# Patient Record
Sex: FEMALE | Race: BLACK OR AFRICAN AMERICAN | NOT HISPANIC OR LATINO | Employment: UNEMPLOYED | RURAL
[De-identification: names, ages, dates, MRNs, and addresses within clinical notes are randomized per-mention and may not be internally consistent; named-entity substitution may affect disease eponyms.]

---

## 2018-11-26 ENCOUNTER — HISTORICAL (OUTPATIENT)
Dept: ADMINISTRATIVE | Facility: HOSPITAL | Age: 28
End: 2018-11-26

## 2018-11-28 LAB
LAB AP CLINICAL INFORMATION: NORMAL
LAB AP COMMENTS: NORMAL
LAB AP GENERAL CAT - HISTORICAL: NORMAL
LAB AP INTERPRETATION/RESULT - HISTORICAL: NEGATIVE
LAB AP SPECIMEN ADEQUACY - HISTORICAL: NORMAL
LAB AP SPECIMEN SUBMITTED - HISTORICAL: NORMAL

## 2020-10-27 ENCOUNTER — HISTORICAL (OUTPATIENT)
Dept: ADMINISTRATIVE | Facility: HOSPITAL | Age: 30
End: 2020-10-27

## 2020-10-27 LAB — PAP SMEAR: ABNORMAL

## 2020-11-02 LAB
LAB AP CLINICAL INFORMATION: NORMAL
LAB AP GENERAL CAT - HISTORICAL: NORMAL
LAB AP INTERPRETATION/RESULT - HISTORICAL: NORMAL
LAB AP SPECIMEN ADEQUACY - HISTORICAL: NORMAL
LAB AP SPECIMEN SUBMITTED - HISTORICAL: NORMAL

## 2021-05-18 ENCOUNTER — OFFICE VISIT (OUTPATIENT)
Dept: OBSTETRICS AND GYNECOLOGY | Facility: CLINIC | Age: 31
End: 2021-05-18
Payer: MEDICAID

## 2021-05-18 VITALS
SYSTOLIC BLOOD PRESSURE: 132 MMHG | HEART RATE: 96 BPM | BODY MASS INDEX: 34.34 KG/M2 | WEIGHT: 186.63 LBS | TEMPERATURE: 97 F | HEIGHT: 62 IN | DIASTOLIC BLOOD PRESSURE: 82 MMHG

## 2021-05-18 DIAGNOSIS — Z30.42 ENCOUNTER FOR SURVEILLANCE OF INJECTABLE CONTRACEPTIVE: Primary | ICD-10-CM

## 2021-05-18 PROCEDURE — 96372 THER/PROPH/DIAG INJ SC/IM: CPT | Mod: ,,, | Performed by: MIDWIFE

## 2021-05-18 PROCEDURE — 99213 OFFICE O/P EST LOW 20 MIN: CPT | Mod: 25,,, | Performed by: MIDWIFE

## 2021-05-18 PROCEDURE — 96372 PR INJECTION,THERAP/PROPH/DIAG2ST, IM OR SUBCUT: ICD-10-PCS | Mod: ,,, | Performed by: MIDWIFE

## 2021-05-18 PROCEDURE — 99213 PR OFFICE/OUTPT VISIT, EST, LEVL III, 20-29 MIN: ICD-10-PCS | Mod: 25,,, | Performed by: MIDWIFE

## 2021-05-18 RX ORDER — MEDROXYPROGESTERONE ACETATE 150 MG/ML
150 INJECTION, SUSPENSION INTRAMUSCULAR
Status: COMPLETED | OUTPATIENT
Start: 2021-05-18 | End: 2021-05-18

## 2021-05-18 RX ORDER — MEDROXYPROGESTERONE ACETATE 150 MG/ML
150 INJECTION, SUSPENSION INTRAMUSCULAR
COMMUNITY
End: 2021-11-23 | Stop reason: ALTCHOICE

## 2021-05-18 RX ADMIN — MEDROXYPROGESTERONE ACETATE 150 MG: 150 INJECTION, SUSPENSION INTRAMUSCULAR at 01:05

## 2021-05-22 PROBLEM — E66.9 OBESITY: Chronic | Status: ACTIVE | Noted: 2021-05-22

## 2021-08-24 ENCOUNTER — OFFICE VISIT (OUTPATIENT)
Dept: OBSTETRICS AND GYNECOLOGY | Facility: CLINIC | Age: 31
End: 2021-08-24
Payer: MEDICAID

## 2021-08-24 VITALS
TEMPERATURE: 98 F | WEIGHT: 188.5 LBS | OXYGEN SATURATION: 100 % | BODY MASS INDEX: 34.69 KG/M2 | HEIGHT: 62 IN | RESPIRATION RATE: 18 BRPM | HEART RATE: 92 BPM | SYSTOLIC BLOOD PRESSURE: 115 MMHG | DIASTOLIC BLOOD PRESSURE: 81 MMHG

## 2021-08-24 DIAGNOSIS — Z30.42 ENCOUNTER FOR SURVEILLANCE OF INJECTABLE CONTRACEPTIVE: ICD-10-CM

## 2021-08-24 DIAGNOSIS — Z72.51 HIGH RISK SEXUAL BEHAVIOR, UNSPECIFIED TYPE: Primary | ICD-10-CM

## 2021-08-24 LAB
B-HCG UR QL: NEGATIVE
CTP QC/QA: YES

## 2021-08-24 PROCEDURE — 99212 PR OFFICE/OUTPT VISIT, EST, LEVL II, 10-19 MIN: ICD-10-PCS | Mod: 25,,, | Performed by: ADVANCED PRACTICE MIDWIFE

## 2021-08-24 PROCEDURE — 96372 PR INJECTION,THERAP/PROPH/DIAG2ST, IM OR SUBCUT: ICD-10-PCS | Mod: ,,, | Performed by: ADVANCED PRACTICE MIDWIFE

## 2021-08-24 PROCEDURE — 96372 THER/PROPH/DIAG INJ SC/IM: CPT | Mod: ,,, | Performed by: ADVANCED PRACTICE MIDWIFE

## 2021-08-24 PROCEDURE — 99212 OFFICE O/P EST SF 10 MIN: CPT | Mod: 25,,, | Performed by: ADVANCED PRACTICE MIDWIFE

## 2021-08-24 PROCEDURE — 81025 URINE PREGNANCY TEST: CPT | Mod: RHCUB | Performed by: ADVANCED PRACTICE MIDWIFE

## 2021-08-24 RX ORDER — MEDROXYPROGESTERONE ACETATE 150 MG/ML
150 INJECTION, SUSPENSION INTRAMUSCULAR
Status: COMPLETED | OUTPATIENT
Start: 2021-08-24 | End: 2021-08-24

## 2021-08-24 RX ADMIN — MEDROXYPROGESTERONE ACETATE 150 MG: 150 INJECTION, SUSPENSION INTRAMUSCULAR at 03:08

## 2021-11-23 ENCOUNTER — OFFICE VISIT (OUTPATIENT)
Dept: OBSTETRICS AND GYNECOLOGY | Facility: CLINIC | Age: 31
End: 2021-11-23
Payer: MEDICAID

## 2021-11-23 VITALS
HEIGHT: 62 IN | SYSTOLIC BLOOD PRESSURE: 138 MMHG | HEART RATE: 96 BPM | DIASTOLIC BLOOD PRESSURE: 87 MMHG | BODY MASS INDEX: 34.56 KG/M2 | RESPIRATION RATE: 17 BRPM | WEIGHT: 187.81 LBS

## 2021-11-23 DIAGNOSIS — Z30.42 ENCOUNTER FOR SURVEILLANCE OF INJECTABLE CONTRACEPTIVE: Primary | ICD-10-CM

## 2021-11-23 PROCEDURE — 99212 PR OFFICE/OUTPT VISIT, EST, LEVL II, 10-19 MIN: ICD-10-PCS | Mod: 25,,, | Performed by: ADVANCED PRACTICE MIDWIFE

## 2021-11-23 PROCEDURE — 99212 OFFICE O/P EST SF 10 MIN: CPT | Mod: 25,,, | Performed by: ADVANCED PRACTICE MIDWIFE

## 2021-11-23 PROCEDURE — 96372 THER/PROPH/DIAG INJ SC/IM: CPT | Mod: ,,, | Performed by: ADVANCED PRACTICE MIDWIFE

## 2021-11-23 PROCEDURE — 96372 PR INJECTION,THERAP/PROPH/DIAG2ST, IM OR SUBCUT: ICD-10-PCS | Mod: ,,, | Performed by: ADVANCED PRACTICE MIDWIFE

## 2021-11-23 RX ORDER — MEDROXYPROGESTERONE ACETATE 150 MG/ML
150 INJECTION, SUSPENSION INTRAMUSCULAR
Status: COMPLETED | OUTPATIENT
Start: 2021-11-23 | End: 2021-11-23

## 2021-11-23 RX ADMIN — MEDROXYPROGESTERONE ACETATE 150 MG: 150 INJECTION, SUSPENSION INTRAMUSCULAR at 02:11

## 2021-12-21 ENCOUNTER — OFFICE VISIT (OUTPATIENT)
Dept: OBSTETRICS AND GYNECOLOGY | Facility: CLINIC | Age: 31
End: 2021-12-21
Payer: MEDICAID

## 2021-12-21 VITALS
OXYGEN SATURATION: 99 % | DIASTOLIC BLOOD PRESSURE: 87 MMHG | WEIGHT: 188 LBS | BODY MASS INDEX: 34.6 KG/M2 | TEMPERATURE: 97 F | HEIGHT: 62 IN | RESPIRATION RATE: 17 BRPM | HEART RATE: 102 BPM | SYSTOLIC BLOOD PRESSURE: 127 MMHG

## 2021-12-21 DIAGNOSIS — Z12.4 ENCOUNTER FOR PAPANICOLAOU SMEAR FOR CERVICAL CANCER SCREENING: ICD-10-CM

## 2021-12-21 DIAGNOSIS — Z01.419 WOMEN'S ANNUAL ROUTINE GYNECOLOGICAL EXAMINATION: Primary | ICD-10-CM

## 2021-12-21 DIAGNOSIS — K21.9 MILD ACID REFLUX: ICD-10-CM

## 2021-12-21 DIAGNOSIS — Z72.51 HIGH RISK HETEROSEXUAL BEHAVIOR: ICD-10-CM

## 2021-12-21 LAB
CANDIDA SPECIES: NEGATIVE
GARDNERELLA: POSITIVE
TRICHOMONAS: NEGATIVE

## 2021-12-21 PROCEDURE — 88142 CYTOPATH C/V THIN LAYER: CPT | Mod: GCY | Performed by: ADVANCED PRACTICE MIDWIFE

## 2021-12-21 PROCEDURE — 87491 CHLAMYDIA/GONORRHOEAE(GC), PCR: ICD-10-PCS | Mod: ,,, | Performed by: CLINICAL MEDICAL LABORATORY

## 2021-12-21 PROCEDURE — 87624 HUMAN PAPILLOMAVIRUS (HPV): ICD-10-PCS | Mod: ,,, | Performed by: CLINICAL MEDICAL LABORATORY

## 2021-12-21 PROCEDURE — 87660 BACTERIAL VAGINOSIS: ICD-10-PCS | Mod: ,,, | Performed by: CLINICAL MEDICAL LABORATORY

## 2021-12-21 PROCEDURE — 87510 GARDNER VAG DNA DIR PROBE: CPT | Mod: ,,, | Performed by: CLINICAL MEDICAL LABORATORY

## 2021-12-21 PROCEDURE — 87510 BACTERIAL VAGINOSIS: ICD-10-PCS | Mod: ,,, | Performed by: CLINICAL MEDICAL LABORATORY

## 2021-12-21 PROCEDURE — 87491 CHLMYD TRACH DNA AMP PROBE: CPT | Mod: ,,, | Performed by: CLINICAL MEDICAL LABORATORY

## 2021-12-21 PROCEDURE — 87660 TRICHOMONAS VAGIN DIR PROBE: CPT | Mod: ,,, | Performed by: CLINICAL MEDICAL LABORATORY

## 2021-12-21 PROCEDURE — 87624 HPV HI-RISK TYP POOLED RSLT: CPT | Mod: ,,, | Performed by: CLINICAL MEDICAL LABORATORY

## 2021-12-21 PROCEDURE — 87591 CHLAMYDIA/GONORRHOEAE(GC), PCR: ICD-10-PCS | Mod: ,,, | Performed by: CLINICAL MEDICAL LABORATORY

## 2021-12-21 PROCEDURE — 99395 PR PREVENTIVE VISIT,EST,18-39: ICD-10-PCS | Mod: ,,, | Performed by: ADVANCED PRACTICE MIDWIFE

## 2021-12-21 PROCEDURE — 87480 BACTERIAL VAGINOSIS: ICD-10-PCS | Mod: ,,, | Performed by: CLINICAL MEDICAL LABORATORY

## 2021-12-21 PROCEDURE — 87591 N.GONORRHOEAE DNA AMP PROB: CPT | Mod: ,,, | Performed by: CLINICAL MEDICAL LABORATORY

## 2021-12-21 PROCEDURE — 87480 CANDIDA DNA DIR PROBE: CPT | Mod: ,,, | Performed by: CLINICAL MEDICAL LABORATORY

## 2021-12-21 PROCEDURE — 99395 PREV VISIT EST AGE 18-39: CPT | Mod: ,,, | Performed by: ADVANCED PRACTICE MIDWIFE

## 2021-12-21 RX ORDER — FAMOTIDINE 20 MG/1
20 TABLET, FILM COATED ORAL 2 TIMES DAILY
Qty: 60 TABLET | Refills: 11 | Status: SHIPPED | OUTPATIENT
Start: 2021-12-21 | End: 2022-12-21

## 2021-12-22 ENCOUNTER — TELEPHONE (OUTPATIENT)
Dept: OBSTETRICS AND GYNECOLOGY | Facility: CLINIC | Age: 31
End: 2021-12-22
Payer: MEDICAID

## 2021-12-22 DIAGNOSIS — N76.0 BACTERIAL VAGINAL INFECTION: Primary | ICD-10-CM

## 2021-12-22 DIAGNOSIS — B96.89 BACTERIAL VAGINAL INFECTION: Primary | ICD-10-CM

## 2021-12-22 LAB
CHLAMYDIA BY PCR: NEGATIVE
N. GONORRHOEAE (GC) BY PCR: NEGATIVE

## 2021-12-22 RX ORDER — METRONIDAZOLE 500 MG/1
500 TABLET ORAL 2 TIMES DAILY
Qty: 14 TABLET | Refills: 0 | Status: SHIPPED | OUTPATIENT
Start: 2021-12-22 | End: 2021-12-29

## 2021-12-27 LAB
GH SERPL-MCNC: NORMAL NG/ML
HPV 16: NEGATIVE
HPV 18: NEGATIVE
HPV OTHER: NEGATIVE
INSULIN SERPL-ACNC: NORMAL U[IU]/ML
LAB AP CLINICAL INFORMATION: NORMAL
LAB AP GYN INTERPRETATION: NEGATIVE
LAB AP PAP DISCLAIMER COMMENTS: NORMAL
RENIN PLAS-CCNC: NORMAL NG/ML/H

## 2021-12-28 ENCOUNTER — TELEPHONE (OUTPATIENT)
Dept: OBSTETRICS AND GYNECOLOGY | Facility: CLINIC | Age: 31
End: 2021-12-28
Payer: MEDICAID

## 2022-02-22 ENCOUNTER — OFFICE VISIT (OUTPATIENT)
Dept: OBSTETRICS AND GYNECOLOGY | Facility: CLINIC | Age: 32
End: 2022-02-22
Payer: MEDICAID

## 2022-02-22 VITALS
WEIGHT: 188.81 LBS | OXYGEN SATURATION: 99 % | HEIGHT: 62 IN | DIASTOLIC BLOOD PRESSURE: 89 MMHG | SYSTOLIC BLOOD PRESSURE: 141 MMHG | HEART RATE: 92 BPM | RESPIRATION RATE: 17 BRPM | BODY MASS INDEX: 34.74 KG/M2

## 2022-02-22 DIAGNOSIS — Z30.42 SURVEILLANCE OF CONTRACEPTIVE INJECTION: Primary | ICD-10-CM

## 2022-02-22 DIAGNOSIS — R11.0 NAUSEA: ICD-10-CM

## 2022-02-22 PROCEDURE — 96372 PR INJECTION,THERAP/PROPH/DIAG2ST, IM OR SUBCUT: ICD-10-PCS | Mod: ,,, | Performed by: ADVANCED PRACTICE MIDWIFE

## 2022-02-22 PROCEDURE — 99212 PR OFFICE/OUTPT VISIT, EST, LEVL II, 10-19 MIN: ICD-10-PCS | Mod: 25,,, | Performed by: ADVANCED PRACTICE MIDWIFE

## 2022-02-22 PROCEDURE — 1159F MED LIST DOCD IN RCRD: CPT | Mod: CPTII,,, | Performed by: ADVANCED PRACTICE MIDWIFE

## 2022-02-22 PROCEDURE — 3008F BODY MASS INDEX DOCD: CPT | Mod: CPTII,,, | Performed by: ADVANCED PRACTICE MIDWIFE

## 2022-02-22 PROCEDURE — 3079F PR MOST RECENT DIASTOLIC BLOOD PRESSURE 80-89 MM HG: ICD-10-PCS | Mod: CPTII,,, | Performed by: ADVANCED PRACTICE MIDWIFE

## 2022-02-22 PROCEDURE — 3008F PR BODY MASS INDEX (BMI) DOCUMENTED: ICD-10-PCS | Mod: CPTII,,, | Performed by: ADVANCED PRACTICE MIDWIFE

## 2022-02-22 PROCEDURE — 3077F PR MOST RECENT SYSTOLIC BLOOD PRESSURE >= 140 MM HG: ICD-10-PCS | Mod: CPTII,,, | Performed by: ADVANCED PRACTICE MIDWIFE

## 2022-02-22 PROCEDURE — 1159F PR MEDICATION LIST DOCUMENTED IN MEDICAL RECORD: ICD-10-PCS | Mod: CPTII,,, | Performed by: ADVANCED PRACTICE MIDWIFE

## 2022-02-22 PROCEDURE — 3079F DIAST BP 80-89 MM HG: CPT | Mod: CPTII,,, | Performed by: ADVANCED PRACTICE MIDWIFE

## 2022-02-22 PROCEDURE — 99212 OFFICE O/P EST SF 10 MIN: CPT | Mod: 25,,, | Performed by: ADVANCED PRACTICE MIDWIFE

## 2022-02-22 PROCEDURE — 3077F SYST BP >= 140 MM HG: CPT | Mod: CPTII,,, | Performed by: ADVANCED PRACTICE MIDWIFE

## 2022-02-22 PROCEDURE — 96372 THER/PROPH/DIAG INJ SC/IM: CPT | Mod: ,,, | Performed by: ADVANCED PRACTICE MIDWIFE

## 2022-02-22 RX ORDER — MEDROXYPROGESTERONE ACETATE 150 MG/ML
150 INJECTION, SUSPENSION INTRAMUSCULAR
Status: COMPLETED | OUTPATIENT
Start: 2022-02-22 | End: 2022-02-22

## 2022-02-22 RX ADMIN — MEDROXYPROGESTERONE ACETATE 150 MG: 150 INJECTION, SUSPENSION INTRAMUSCULAR at 04:02

## 2022-02-22 NOTE — PROGRESS NOTES
"  Patient ID:  Marita Hernandez is a 31 y.o. female      Chief Complaint:   Chief Complaint   Patient presents with    Contraception     Depo        HPI:  J Carlos is in for repeat depo injection within dates. She denies ACHES, vag discharge, and abnormal vag bleeding. Declines testing for STI. C/O nausea off & on, denies vomiting. Has had GB removed. Discussed reflux as possible cause of nausea. She was given rx for Pepcid at last appointment but has not been taking. Encouraged to start Pepcid 2 x daily as ordered. Will send rx Zofran for prn use. Abd US if continues to have nausea.   LMP: no cycles   Sexually active: yes, does not use condoms      Past Medical History:   Diagnosis Date    Abnormal Pap smear of cervix     Hypertension     Obesity 2021     Past Surgical History:   Procedure Laterality Date    CHOLECYSTECTOMY         OB History        2    Para   2    Term   2       0    AB   0    Living   2       SAB   0    IAB   0    Ectopic   0    Multiple   0    Live Births   2                 BP (!) 141/89 (BP Location: Right arm)   Pulse 92   Resp 17   Ht 5' 2" (1.575 m)   Wt 85.6 kg (188 lb 12.8 oz)   SpO2 99%   BMI 34.53 kg/m²   Wt Readings from Last 3 Encounters:   22 85.6 kg (188 lb 12.8 oz)   21 85.3 kg (188 lb)   21 85.2 kg (187 lb 12.8 oz)          ROS:  Review of Systems   Constitutional: Negative.    Eyes: Negative.    Respiratory: Negative.    Cardiovascular: Negative.    Gastrointestinal: Positive for nausea.   Genitourinary: Negative.    Musculoskeletal: Negative.    Neurological: Negative.    Psychiatric/Behavioral: Negative.           PHYSICAL EXAM:  Physical Exam  Constitutional:       Appearance: Normal appearance.   Eyes:      Extraocular Movements: Extraocular movements intact.   Cardiovascular:      Rate and Rhythm: Normal rate.      Pulses: Normal pulses.   Pulmonary:      Effort: Pulmonary effort is normal.   Abdominal:      Palpations: Abdomen " "is soft.   Musculoskeletal:         General: Normal range of motion.      Cervical back: Normal range of motion.   Skin:     General: Skin is warm and dry.   Neurological:      Mental Status: She is alert and oriented to person, place, and time.   Psychiatric:         Mood and Affect: Mood normal.         Behavior: Behavior normal.         Thought Content: Thought content normal.         Judgment: Judgment normal.          Assessment:  Surveillance of contraceptive injection  -     medroxyPROGESTERone (DEPO-PROVERA) injection 150 mg    BMI 34.0-34.9,adult    Nausea          ICD-10-CM ICD-9-CM    1. Surveillance of contraceptive injection  Z30.42 V25.49 medroxyPROGESTERone (DEPO-PROVERA) injection 150 mg   2. BMI 34.0-34.9,adult  Z68.34 V85.34    3. Nausea  R11.0 787.02        Plan:  Depo Provera 150 mg IM given, Reviewed "ACHES" of contraceptives and possible S/E  Encouraged Vitamin D and calcium supplements  Instructed on condom use during each sexual encounter    Follow up in about 3 months (around 5/22/2022) for repeat depo injection.                      "

## 2022-05-10 ENCOUNTER — OFFICE VISIT (OUTPATIENT)
Dept: OBSTETRICS AND GYNECOLOGY | Facility: CLINIC | Age: 32
End: 2022-05-10
Payer: MEDICAID

## 2022-05-10 VITALS
OXYGEN SATURATION: 99 % | SYSTOLIC BLOOD PRESSURE: 132 MMHG | DIASTOLIC BLOOD PRESSURE: 84 MMHG | WEIGHT: 185.38 LBS | RESPIRATION RATE: 17 BRPM | HEIGHT: 62 IN | HEART RATE: 90 BPM | BODY MASS INDEX: 34.11 KG/M2

## 2022-05-10 DIAGNOSIS — Z30.42 SURVEILLANCE OF CONTRACEPTIVE INJECTION: Primary | ICD-10-CM

## 2022-05-10 PROCEDURE — 1159F PR MEDICATION LIST DOCUMENTED IN MEDICAL RECORD: ICD-10-PCS | Mod: CPTII,,, | Performed by: ADVANCED PRACTICE MIDWIFE

## 2022-05-10 PROCEDURE — 1159F MED LIST DOCD IN RCRD: CPT | Mod: CPTII,,, | Performed by: ADVANCED PRACTICE MIDWIFE

## 2022-05-10 PROCEDURE — 96372 PR INJECTION,THERAP/PROPH/DIAG2ST, IM OR SUBCUT: ICD-10-PCS | Mod: ,,, | Performed by: ADVANCED PRACTICE MIDWIFE

## 2022-05-10 PROCEDURE — 96372 THER/PROPH/DIAG INJ SC/IM: CPT | Mod: ,,, | Performed by: ADVANCED PRACTICE MIDWIFE

## 2022-05-10 PROCEDURE — 3008F BODY MASS INDEX DOCD: CPT | Mod: CPTII,,, | Performed by: ADVANCED PRACTICE MIDWIFE

## 2022-05-10 PROCEDURE — 3079F DIAST BP 80-89 MM HG: CPT | Mod: CPTII,,, | Performed by: ADVANCED PRACTICE MIDWIFE

## 2022-05-10 PROCEDURE — 3079F PR MOST RECENT DIASTOLIC BLOOD PRESSURE 80-89 MM HG: ICD-10-PCS | Mod: CPTII,,, | Performed by: ADVANCED PRACTICE MIDWIFE

## 2022-05-10 PROCEDURE — 3075F PR MOST RECENT SYSTOLIC BLOOD PRESS GE 130-139MM HG: ICD-10-PCS | Mod: CPTII,,, | Performed by: ADVANCED PRACTICE MIDWIFE

## 2022-05-10 PROCEDURE — 3008F PR BODY MASS INDEX (BMI) DOCUMENTED: ICD-10-PCS | Mod: CPTII,,, | Performed by: ADVANCED PRACTICE MIDWIFE

## 2022-05-10 PROCEDURE — 3075F SYST BP GE 130 - 139MM HG: CPT | Mod: CPTII,,, | Performed by: ADVANCED PRACTICE MIDWIFE

## 2022-05-10 RX ORDER — MEDROXYPROGESTERONE ACETATE 150 MG/ML
150 INJECTION, SUSPENSION INTRAMUSCULAR
Status: COMPLETED | OUTPATIENT
Start: 2022-05-10 | End: 2022-05-10

## 2022-05-10 RX ADMIN — MEDROXYPROGESTERONE ACETATE 150 MG: 150 INJECTION, SUSPENSION INTRAMUSCULAR at 03:05

## 2022-05-10 NOTE — PROGRESS NOTES
"  Patient ID:  Marita Hernandez is a 31 y.o. female      Chief Complaint:   Chief Complaint   Patient presents with    Contraception     Depo        HPI:  Marita is in for repeat depo injection within dates. She denies ACHES, vag discharge, and abnormal vag bleeding. Desires to continue injections. Discussed weight management. She has lost 3 lbs since last visit. Provided encouragement to continue weight loss.   LMP: No LMP recorded. Patient has had an injection.  Sexually active: yes       Past Medical History:   Diagnosis Date    Abnormal Pap smear of cervix     Hypertension     Obesity 2021     Past Surgical History:   Procedure Laterality Date    CHOLECYSTECTOMY         OB History        2    Para   2    Term   2       0    AB   0    Living   2       SAB   0    IAB   0    Ectopic   0    Multiple   0    Live Births   2                 /84 (BP Location: Right arm)   Pulse 90   Resp 17   Ht 5' 2" (1.575 m)   Wt 84.1 kg (185 lb 6.4 oz)   SpO2 99%   BMI 33.91 kg/m²   Wt Readings from Last 3 Encounters:   05/10/22 84.1 kg (185 lb 6.4 oz)   22 85.6 kg (188 lb 12.8 oz)   21 85.3 kg (188 lb)          ROS:  Review of Systems   Constitutional: Negative.    Eyes: Negative.    Respiratory: Negative.    Cardiovascular: Negative.    Gastrointestinal: Negative.    Genitourinary: Negative.    Musculoskeletal: Negative.    Neurological: Negative.    Psychiatric/Behavioral: Negative.           PHYSICAL EXAM:  Physical Exam  Constitutional:       Appearance: Normal appearance. She is obese.   Eyes:      Extraocular Movements: Extraocular movements intact.   Cardiovascular:      Rate and Rhythm: Normal rate.      Pulses: Normal pulses.   Pulmonary:      Effort: Pulmonary effort is normal.   Abdominal:      Palpations: Abdomen is soft.   Musculoskeletal:         General: Normal range of motion.      Cervical back: Normal range of motion.   Skin:     General: Skin is warm and dry. " "  Neurological:      Mental Status: She is alert and oriented to person, place, and time.   Psychiatric:         Mood and Affect: Mood normal.         Behavior: Behavior normal.         Thought Content: Thought content normal.         Judgment: Judgment normal.          Assessment:  Surveillance of contraceptive injection  -     medroxyPROGESTERone (DEPO-PROVERA) injection 150 mg    BMI 33.0-33.9,adult          ICD-10-CM ICD-9-CM    1. Surveillance of contraceptive injection  Z30.42 V25.49 medroxyPROGESTERone (DEPO-PROVERA) injection 150 mg   2. BMI 33.0-33.9,adult  Z68.33 V85.33        Plan:  Depo Provera 150 mg IM given, Reviewed "ACHES" of contraceptives and possible S/E  Encouraged Vitamin D and calcium supplements  Instructed on condom use during each sexual encounter  Discussed healthy BMI and weight ,management    Follow up in about 3 months (around 8/10/2022) for repeat depo injection.                    "

## 2022-08-09 ENCOUNTER — OFFICE VISIT (OUTPATIENT)
Dept: OBSTETRICS AND GYNECOLOGY | Facility: CLINIC | Age: 32
End: 2022-08-09
Payer: MEDICAID

## 2022-08-09 VITALS
SYSTOLIC BLOOD PRESSURE: 120 MMHG | HEART RATE: 96 BPM | RESPIRATION RATE: 17 BRPM | HEIGHT: 62 IN | WEIGHT: 185 LBS | DIASTOLIC BLOOD PRESSURE: 81 MMHG | BODY MASS INDEX: 34.04 KG/M2 | OXYGEN SATURATION: 99 %

## 2022-08-09 DIAGNOSIS — Z30.42 SURVEILLANCE OF CONTRACEPTIVE INJECTION: Primary | ICD-10-CM

## 2022-08-09 PROCEDURE — 3079F DIAST BP 80-89 MM HG: CPT | Mod: CPTII,,, | Performed by: ADVANCED PRACTICE MIDWIFE

## 2022-08-09 PROCEDURE — 96372 PR INJECTION,THERAP/PROPH/DIAG2ST, IM OR SUBCUT: ICD-10-PCS | Mod: ,,, | Performed by: ADVANCED PRACTICE MIDWIFE

## 2022-08-09 PROCEDURE — 3074F PR MOST RECENT SYSTOLIC BLOOD PRESSURE < 130 MM HG: ICD-10-PCS | Mod: CPTII,,, | Performed by: ADVANCED PRACTICE MIDWIFE

## 2022-08-09 PROCEDURE — 3008F BODY MASS INDEX DOCD: CPT | Mod: CPTII,,, | Performed by: ADVANCED PRACTICE MIDWIFE

## 2022-08-09 PROCEDURE — 3079F PR MOST RECENT DIASTOLIC BLOOD PRESSURE 80-89 MM HG: ICD-10-PCS | Mod: CPTII,,, | Performed by: ADVANCED PRACTICE MIDWIFE

## 2022-08-09 PROCEDURE — 96372 THER/PROPH/DIAG INJ SC/IM: CPT | Mod: ,,, | Performed by: ADVANCED PRACTICE MIDWIFE

## 2022-08-09 PROCEDURE — 3008F PR BODY MASS INDEX (BMI) DOCUMENTED: ICD-10-PCS | Mod: CPTII,,, | Performed by: ADVANCED PRACTICE MIDWIFE

## 2022-08-09 PROCEDURE — 1159F MED LIST DOCD IN RCRD: CPT | Mod: CPTII,,, | Performed by: ADVANCED PRACTICE MIDWIFE

## 2022-08-09 PROCEDURE — 3074F SYST BP LT 130 MM HG: CPT | Mod: CPTII,,, | Performed by: ADVANCED PRACTICE MIDWIFE

## 2022-08-09 PROCEDURE — 1159F PR MEDICATION LIST DOCUMENTED IN MEDICAL RECORD: ICD-10-PCS | Mod: CPTII,,, | Performed by: ADVANCED PRACTICE MIDWIFE

## 2022-08-09 RX ORDER — MEDROXYPROGESTERONE ACETATE 150 MG/ML
150 INJECTION, SUSPENSION INTRAMUSCULAR
Status: COMPLETED | OUTPATIENT
Start: 2022-08-09 | End: 2022-08-09

## 2022-08-09 RX ADMIN — MEDROXYPROGESTERONE ACETATE 150 MG: 150 INJECTION, SUSPENSION INTRAMUSCULAR at 04:08

## 2022-08-09 NOTE — PROGRESS NOTES
"  Patient ID:  Marita Hernandez is a 31 y.o. female      Chief Complaint:   Chief Complaint   Patient presents with    Contraception     Depo        HPI:  Marita is in for repeat depo injection within dates. Denies ACHES, vag discharge, and abnormal vag bleeding. Declines STD testing. Desires to continue injections.   LMP: No LMP recorded. Patient has had an injection.  Sexually active: yes  Last Pap tests: ASCUS 10/27/2020,  NILM 2021      Past Medical History:   Diagnosis Date    Abnormal Pap smear of cervix     Hypertension     Obesity 2021     Past Surgical History:   Procedure Laterality Date    CHOLECYSTECTOMY         OB History        2    Para   2    Term   2       0    AB   0    Living   2       SAB   0    IAB   0    Ectopic   0    Multiple   0    Live Births   2                 /81 (BP Location: Right arm)   Pulse 96   Resp 17   Ht 5' 2" (1.575 m)   Wt 83.9 kg (185 lb)   SpO2 99%   BMI 33.84 kg/m²   Wt Readings from Last 3 Encounters:   22 83.9 kg (185 lb)   05/10/22 84.1 kg (185 lb 6.4 oz)   22 85.6 kg (188 lb 12.8 oz)          ROS:  Review of Systems   Constitutional: Negative.    Eyes: Negative.    Respiratory: Negative.    Cardiovascular: Negative.    Gastrointestinal: Negative.    Genitourinary: Negative.    Musculoskeletal: Negative.    Neurological: Negative.    Psychiatric/Behavioral: Negative.           PHYSICAL EXAM:  Physical Exam  Constitutional:       Appearance: Normal appearance. She is obese.   Eyes:      Extraocular Movements: Extraocular movements intact.   Cardiovascular:      Rate and Rhythm: Normal rate.      Pulses: Normal pulses.   Pulmonary:      Effort: Pulmonary effort is normal.   Abdominal:      Palpations: Abdomen is soft.   Musculoskeletal:         General: Normal range of motion.      Cervical back: Normal range of motion.   Skin:     General: Skin is warm and dry.   Neurological:      Mental Status: She is alert and " "oriented to person, place, and time.   Psychiatric:         Mood and Affect: Mood normal.         Behavior: Behavior normal.         Thought Content: Thought content normal.         Judgment: Judgment normal.          Assessment:  Surveillance of contraceptive injection  -     medroxyPROGESTERone (DEPO-PROVERA) injection 150 mg    BMI 33.0-33.9,adult          ICD-10-CM ICD-9-CM    1. Surveillance of contraceptive injection  Z30.42 V25.49 medroxyPROGESTERone (DEPO-PROVERA) injection 150 mg   2. BMI 33.0-33.9,adult  Z68.33 V85.33        Plan:  Depo Provera 150 mg IM given, Reviewed "ACHES" of contraceptives and possible S/E  Encouraged Vitamin D and calcium supplements  Instructed on condom use during each sexual encounter    Follow up in about 3 months (around 11/9/2022) for repeat depo injection.                    "

## 2022-08-26 ENCOUNTER — TELEPHONE (OUTPATIENT)
Dept: EMERGENCY MEDICINE | Facility: HOSPITAL | Age: 32
End: 2022-08-26
Payer: MEDICAID

## 2022-11-08 ENCOUNTER — OFFICE VISIT (OUTPATIENT)
Dept: OBSTETRICS AND GYNECOLOGY | Facility: CLINIC | Age: 32
End: 2022-11-08
Payer: MEDICAID

## 2022-11-08 VITALS
HEIGHT: 62 IN | RESPIRATION RATE: 17 BRPM | HEART RATE: 103 BPM | WEIGHT: 187.63 LBS | BODY MASS INDEX: 34.53 KG/M2 | SYSTOLIC BLOOD PRESSURE: 128 MMHG | OXYGEN SATURATION: 99 % | DIASTOLIC BLOOD PRESSURE: 85 MMHG

## 2022-11-08 DIAGNOSIS — Z30.42 SURVEILLANCE OF CONTRACEPTIVE INJECTION: Primary | ICD-10-CM

## 2022-11-08 PROCEDURE — 1159F MED LIST DOCD IN RCRD: CPT | Mod: CPTII,,, | Performed by: ADVANCED PRACTICE MIDWIFE

## 2022-11-08 PROCEDURE — 3079F DIAST BP 80-89 MM HG: CPT | Mod: CPTII,,, | Performed by: ADVANCED PRACTICE MIDWIFE

## 2022-11-08 PROCEDURE — 1159F PR MEDICATION LIST DOCUMENTED IN MEDICAL RECORD: ICD-10-PCS | Mod: CPTII,,, | Performed by: ADVANCED PRACTICE MIDWIFE

## 2022-11-08 PROCEDURE — 96372 THER/PROPH/DIAG INJ SC/IM: CPT | Mod: ,,, | Performed by: ADVANCED PRACTICE MIDWIFE

## 2022-11-08 PROCEDURE — 3074F PR MOST RECENT SYSTOLIC BLOOD PRESSURE < 130 MM HG: ICD-10-PCS | Mod: CPTII,,, | Performed by: ADVANCED PRACTICE MIDWIFE

## 2022-11-08 PROCEDURE — 96372 PR INJECTION,THERAP/PROPH/DIAG2ST, IM OR SUBCUT: ICD-10-PCS | Mod: ,,, | Performed by: ADVANCED PRACTICE MIDWIFE

## 2022-11-08 PROCEDURE — 3074F SYST BP LT 130 MM HG: CPT | Mod: CPTII,,, | Performed by: ADVANCED PRACTICE MIDWIFE

## 2022-11-08 PROCEDURE — 3008F PR BODY MASS INDEX (BMI) DOCUMENTED: ICD-10-PCS | Mod: CPTII,,, | Performed by: ADVANCED PRACTICE MIDWIFE

## 2022-11-08 PROCEDURE — 3079F PR MOST RECENT DIASTOLIC BLOOD PRESSURE 80-89 MM HG: ICD-10-PCS | Mod: CPTII,,, | Performed by: ADVANCED PRACTICE MIDWIFE

## 2022-11-08 PROCEDURE — 3008F BODY MASS INDEX DOCD: CPT | Mod: CPTII,,, | Performed by: ADVANCED PRACTICE MIDWIFE

## 2022-11-08 RX ORDER — MEDROXYPROGESTERONE ACETATE 150 MG/ML
150 INJECTION, SUSPENSION INTRAMUSCULAR
Status: COMPLETED | OUTPATIENT
Start: 2022-11-08 | End: 2022-11-08

## 2022-11-08 RX ADMIN — MEDROXYPROGESTERONE ACETATE 150 MG: 150 INJECTION, SUSPENSION INTRAMUSCULAR at 03:11

## 2022-11-08 NOTE — PROGRESS NOTES
"  Patient ID:  Marita Hernandez is a 32 y.o. female      Chief Complaint:   Chief Complaint   Patient presents with    Contraception     Depo        HPI:  Marita is in requesting repeat depo injection within dates. Denies ACHES, vag discharge, and abnormal vag bleeding. No complaints. Declines STD testing.   LMP: No LMP recorded. Patient has had an injection.  Sexually active: yes  Last Pap NILM 21    Past Medical History:   Diagnosis Date    Abnormal Pap smear of cervix     Hypertension     Obesity 2021       Past Surgical History:   Procedure Laterality Date    CHOLECYSTECTOMY         OB History          2    Para   2    Term   2       0    AB   0    Living   2         SAB   0    IAB   0    Ectopic   0    Multiple   0    Live Births   2                 /85 (BP Location: Right arm)   Pulse 103   Resp 17   Ht 5' 2" (1.575 m)   Wt 85.1 kg (187 lb 9.6 oz)   SpO2 99%   BMI 34.31 kg/m²   Wt Readings from Last 3 Encounters:   22 85.1 kg (187 lb 9.6 oz)   22 83.9 kg (185 lb)   05/10/22 84.1 kg (185 lb 6.4 oz)          ROS:  Review of Systems   Constitutional: Negative.    Eyes: Negative.    Respiratory: Negative.     Cardiovascular: Negative.    Gastrointestinal: Negative.    Genitourinary: Negative.    Musculoskeletal: Negative.    Neurological: Negative.    Psychiatric/Behavioral: Negative.          PHYSICAL EXAM:  Physical Exam  Constitutional:       Appearance: Normal appearance. She is obese.   Eyes:      Extraocular Movements: Extraocular movements intact.   Cardiovascular:      Rate and Rhythm: Normal rate.      Pulses: Normal pulses.   Pulmonary:      Effort: Pulmonary effort is normal.   Abdominal:      Palpations: Abdomen is soft.   Musculoskeletal:         General: Normal range of motion.      Cervical back: Normal range of motion.   Skin:     General: Skin is warm and dry.   Neurological:      Mental Status: She is alert and oriented to person, place, and time. " "  Psychiatric:         Mood and Affect: Mood normal.         Behavior: Behavior normal.         Thought Content: Thought content normal.         Judgment: Judgment normal.        Assessment:  Surveillance of contraceptive injection  -     medroxyPROGESTERone (DEPO-PROVERA) injection 150 mg    BMI 34.0-34.9,adult        ICD-10-CM ICD-9-CM    1. Surveillance of contraceptive injection  Z30.42 V25.49 medroxyPROGESTERone (DEPO-PROVERA) injection 150 mg      2. BMI 34.0-34.9,adult  Z68.34 V85.34           Plan:  Depo Provera 150 mg IM given, Reviewed "ACHES" of contraceptives and possible S/E  Encouraged Vitamin D and calcium supplements or daily multivitamin  Instructed on condom use during each sexual encounter to prevent STD exposure    Follow up in about 3 months (around 2/8/2023) for repeat depo injection.                      "

## 2023-01-24 ENCOUNTER — OFFICE VISIT (OUTPATIENT)
Dept: OBSTETRICS AND GYNECOLOGY | Facility: CLINIC | Age: 33
End: 2023-01-24
Payer: MEDICAID

## 2023-01-24 VITALS
WEIGHT: 188 LBS | HEIGHT: 62 IN | OXYGEN SATURATION: 99 % | SYSTOLIC BLOOD PRESSURE: 146 MMHG | RESPIRATION RATE: 17 BRPM | HEART RATE: 80 BPM | DIASTOLIC BLOOD PRESSURE: 93 MMHG | BODY MASS INDEX: 34.6 KG/M2

## 2023-01-24 DIAGNOSIS — Z30.42 SURVEILLANCE OF CONTRACEPTIVE INJECTION: Primary | ICD-10-CM

## 2023-01-24 PROCEDURE — 3008F PR BODY MASS INDEX (BMI) DOCUMENTED: ICD-10-PCS | Mod: CPTII,,, | Performed by: ADVANCED PRACTICE MIDWIFE

## 2023-01-24 PROCEDURE — 3077F SYST BP >= 140 MM HG: CPT | Mod: CPTII,,, | Performed by: ADVANCED PRACTICE MIDWIFE

## 2023-01-24 PROCEDURE — 3008F BODY MASS INDEX DOCD: CPT | Mod: CPTII,,, | Performed by: ADVANCED PRACTICE MIDWIFE

## 2023-01-24 PROCEDURE — 1159F PR MEDICATION LIST DOCUMENTED IN MEDICAL RECORD: ICD-10-PCS | Mod: CPTII,,, | Performed by: ADVANCED PRACTICE MIDWIFE

## 2023-01-24 PROCEDURE — 99212 OFFICE O/P EST SF 10 MIN: CPT | Mod: 25,,, | Performed by: ADVANCED PRACTICE MIDWIFE

## 2023-01-24 PROCEDURE — 96372 THER/PROPH/DIAG INJ SC/IM: CPT | Mod: ,,, | Performed by: ADVANCED PRACTICE MIDWIFE

## 2023-01-24 PROCEDURE — 96372 PR INJECTION,THERAP/PROPH/DIAG2ST, IM OR SUBCUT: ICD-10-PCS | Mod: ,,, | Performed by: ADVANCED PRACTICE MIDWIFE

## 2023-01-24 PROCEDURE — 3080F DIAST BP >= 90 MM HG: CPT | Mod: CPTII,,, | Performed by: ADVANCED PRACTICE MIDWIFE

## 2023-01-24 PROCEDURE — 99212 PR OFFICE/OUTPT VISIT, EST, LEVL II, 10-19 MIN: ICD-10-PCS | Mod: 25,,, | Performed by: ADVANCED PRACTICE MIDWIFE

## 2023-01-24 PROCEDURE — 1159F MED LIST DOCD IN RCRD: CPT | Mod: CPTII,,, | Performed by: ADVANCED PRACTICE MIDWIFE

## 2023-01-24 PROCEDURE — 3080F PR MOST RECENT DIASTOLIC BLOOD PRESSURE >= 90 MM HG: ICD-10-PCS | Mod: CPTII,,, | Performed by: ADVANCED PRACTICE MIDWIFE

## 2023-01-24 PROCEDURE — 3077F PR MOST RECENT SYSTOLIC BLOOD PRESSURE >= 140 MM HG: ICD-10-PCS | Mod: CPTII,,, | Performed by: ADVANCED PRACTICE MIDWIFE

## 2023-01-24 RX ORDER — MEDROXYPROGESTERONE ACETATE 150 MG/ML
150 INJECTION, SUSPENSION INTRAMUSCULAR
Status: COMPLETED | OUTPATIENT
Start: 2023-01-24 | End: 2023-01-24

## 2023-01-24 RX ADMIN — MEDROXYPROGESTERONE ACETATE 150 MG: 150 INJECTION, SUSPENSION INTRAMUSCULAR at 03:01

## 2023-01-24 NOTE — PROGRESS NOTES
"  Patient ID:  Marita Hernandez is a 32 y.o. female      Chief Complaint:   Chief Complaint   Patient presents with    Contraception     Depo        HPI:  Marita is in for repeat depo injection within dates. Last injection given 22. Denies ACHES, vag discharge, and abnormal vag bleeding. Declines STD testing. No issues, problems, or complaints.   LMP: No LMP recorded. Patient has had an injection.  Sexually active: yes    Past Medical History:   Diagnosis Date    Abnormal Pap smear of cervix     Hypertension     Obesity 2021       Past Surgical History:   Procedure Laterality Date    CHOLECYSTECTOMY         OB History          2    Para   2    Term   2       0    AB   0    Living   2         SAB   0    IAB   0    Ectopic   0    Multiple   0    Live Births   2                 BP (!) 146/93 (BP Location: Right arm)   Pulse 80   Resp 17   Ht 5' 2" (1.575 m)   Wt 85.3 kg (188 lb)   SpO2 99%   BMI 34.39 kg/m²   Wt Readings from Last 3 Encounters:   23 85.3 kg (188 lb)   22 85.1 kg (187 lb 9.6 oz)   22 83.9 kg (185 lb)          ROS:  Review of Systems   Constitutional: Negative.    Eyes: Negative.    Respiratory: Negative.     Cardiovascular: Negative.    Gastrointestinal: Negative.    Genitourinary: Negative.    Musculoskeletal: Negative.    Neurological: Negative.    Psychiatric/Behavioral: Negative.          PHYSICAL EXAM:  Physical Exam  Constitutional:       Appearance: Normal appearance. She is obese.   Eyes:      Extraocular Movements: Extraocular movements intact.   Cardiovascular:      Rate and Rhythm: Normal rate.      Pulses: Normal pulses.   Pulmonary:      Effort: Pulmonary effort is normal.   Abdominal:      Palpations: Abdomen is soft.   Musculoskeletal:         General: Normal range of motion.      Cervical back: Normal range of motion.   Skin:     General: Skin is warm and dry.   Neurological:      Mental Status: She is alert and oriented to person, place, " "and time.   Psychiatric:         Mood and Affect: Mood normal.         Behavior: Behavior normal.         Thought Content: Thought content normal.         Judgment: Judgment normal.        Assessment:  Surveillance of contraceptive injection  -     medroxyPROGESTERone (DEPO-PROVERA) injection 150 mg    BMI 34.0-34.9,adult          ICD-10-CM ICD-9-CM    1. Surveillance of contraceptive injection  Z30.42 V25.49 medroxyPROGESTERone (DEPO-PROVERA) injection 150 mg      2. BMI 34.0-34.9,adult  Z68.34 V85.34           Plan:  Depo Provera 150 mg IM given, Reviewed "ACHES" of contraceptives and possible S/E  Encouraged Vitamin D and calcium supplements or daily multivitamin  Instructed on condom use during each sexual encounter to decrease STD exposure risk    Follow up in about 3 months (around 4/24/2023) for repeat depo injection.                    "

## 2024-08-06 ENCOUNTER — OFFICE VISIT (OUTPATIENT)
Dept: OBSTETRICS AND GYNECOLOGY | Facility: CLINIC | Age: 34
End: 2024-08-06
Payer: MEDICAID

## 2024-08-06 VITALS
WEIGHT: 185 LBS | BODY MASS INDEX: 34.04 KG/M2 | DIASTOLIC BLOOD PRESSURE: 74 MMHG | RESPIRATION RATE: 18 BRPM | SYSTOLIC BLOOD PRESSURE: 116 MMHG | HEIGHT: 62 IN | HEART RATE: 80 BPM | TEMPERATURE: 99 F | OXYGEN SATURATION: 100 %

## 2024-08-06 DIAGNOSIS — Z32.02 URINE PREGNANCY TEST NEGATIVE: ICD-10-CM

## 2024-08-06 DIAGNOSIS — E66.9 OBESITY (BMI 30.0-34.9): ICD-10-CM

## 2024-08-06 DIAGNOSIS — Z30.9 ENCOUNTER FOR CONTRACEPTIVE MANAGEMENT, UNSPECIFIED TYPE: Primary | ICD-10-CM

## 2024-08-06 LAB
B-HCG UR QL: NEGATIVE
CTP QC/QA: YES

## 2024-08-06 PROCEDURE — 3078F DIAST BP <80 MM HG: CPT | Mod: CPTII,,, | Performed by: ADVANCED PRACTICE MIDWIFE

## 2024-08-06 PROCEDURE — 96372 THER/PROPH/DIAG INJ SC/IM: CPT | Mod: ,,, | Performed by: ADVANCED PRACTICE MIDWIFE

## 2024-08-06 PROCEDURE — 81025 URINE PREGNANCY TEST: CPT | Mod: RHCUB | Performed by: ADVANCED PRACTICE MIDWIFE

## 2024-08-06 PROCEDURE — 99213 OFFICE O/P EST LOW 20 MIN: CPT | Mod: 25,,, | Performed by: ADVANCED PRACTICE MIDWIFE

## 2024-08-06 PROCEDURE — 3074F SYST BP LT 130 MM HG: CPT | Mod: CPTII,,, | Performed by: ADVANCED PRACTICE MIDWIFE

## 2024-08-06 PROCEDURE — 3008F BODY MASS INDEX DOCD: CPT | Mod: CPTII,,, | Performed by: ADVANCED PRACTICE MIDWIFE

## 2024-08-06 PROCEDURE — 1159F MED LIST DOCD IN RCRD: CPT | Mod: CPTII,,, | Performed by: ADVANCED PRACTICE MIDWIFE

## 2024-08-06 RX ORDER — MEDROXYPROGESTERONE ACETATE 150 MG/ML
150 INJECTION, SUSPENSION INTRAMUSCULAR ONCE
Status: COMPLETED | OUTPATIENT
Start: 2024-08-06 | End: 2024-08-06

## 2024-08-06 RX ADMIN — MEDROXYPROGESTERONE ACETATE 150 MG: 150 INJECTION, SUSPENSION INTRAMUSCULAR at 03:08

## 2024-08-06 NOTE — PROGRESS NOTES
"  Patient ID:  Marita Hernandez is a 33 y.o. female      Chief Complaint:   Chief Complaint   Patient presents with    Contraception     Patient is here to get back on Depo injection for Contraception.Last Depo injection was 2023 and she is outside of her window. Next injection will be due on 10/22/.         HPI:  Marita Hernandez is in requesting to restart depo injections. Last injection given 2023.  No gyn issues, problems, or complaints. Due for annual exam with Pap. Last Pap NILM, HPV negative .   LMP: Patient's last menstrual period was 2024.  Sexually active: yes, declined STD testing    Past Medical History:   Diagnosis Date    Abnormal Pap smear of cervix     Hypertension     Obesity 2021       Past Surgical History:   Procedure Laterality Date    CHOLECYSTECTOMY         OB History          2    Para   2    Term   2       0    AB   0    Living   2         SAB   0    IAB   0    Ectopic   0    Multiple   0    Live Births   2                 /74 (BP Location: Left arm, Patient Position: Sitting, BP Method: Large (Automatic))   Pulse 80   Temp 98.6 °F (37 °C) (Oral)   Resp 18   Ht 5' 2" (1.575 m)   Wt 83.9 kg (185 lb)   LMP 2024 Comment: Wants to start back taking Depo injection for contraception  SpO2 100%   BMI 33.84 kg/m²   Wt Readings from Last 3 Encounters:   24 83.9 kg (185 lb)   23 85.3 kg (188 lb)   22 85.1 kg (187 lb 9.6 oz)          ROS:  Review of Systems   Constitutional: Negative.    Eyes: Negative.    Respiratory: Negative.     Cardiovascular: Negative.    Gastrointestinal: Negative.    Genitourinary: Negative.    Musculoskeletal: Negative.    Neurological: Negative.    Psychiatric/Behavioral: Negative.            PHYSICAL EXAM:  Physical Exam  Constitutional:       Appearance: Normal appearance. She is obese.   Eyes:      Extraocular Movements: Extraocular movements intact.   Cardiovascular:      Rate and Rhythm: " "Normal rate.      Pulses: Normal pulses.   Pulmonary:      Effort: Pulmonary effort is normal.   Musculoskeletal:         General: Normal range of motion.      Cervical back: Normal range of motion.   Skin:     General: Skin is warm and dry.   Neurological:      Mental Status: She is alert and oriented to person, place, and time.   Psychiatric:         Mood and Affect: Mood normal.         Behavior: Behavior normal.         Thought Content: Thought content normal.         Judgment: Judgment normal.          Assessment:  Encounter for contraceptive management, unspecified type  -     POCT urine pregnancy  -     medroxyPROGESTERone (DEPO-PROVERA) syringe 150 mg    Obesity (BMI 30.0-34.9)          ICD-10-CM ICD-9-CM    1. Encounter for contraceptive management, unspecified type  Z30.9 V25.9 POCT urine pregnancy      medroxyPROGESTERone (DEPO-PROVERA) syringe 150 mg      2. Obesity (BMI 30.0-34.9)  E66.9 278.00           Plan:  UPT negative  Depo Provera 150 mg IM given, Reviewed "ACHES" of contraceptives and possible S/E  Encouraged Vitamin D and calcium supplements or daily multivitamin  Instructed on condom use during each sexual encounter to decrease STD exposure risk  Encouraged to schedule annual exam with Pap    Follow up in about 3 months (around 11/6/2024) for repeat depo injection.                    "

## 2024-10-29 ENCOUNTER — OFFICE VISIT (OUTPATIENT)
Dept: OBSTETRICS AND GYNECOLOGY | Facility: CLINIC | Age: 34
End: 2024-10-29
Payer: MEDICAID

## 2024-10-29 VITALS
WEIGHT: 182.63 LBS | BODY MASS INDEX: 33.61 KG/M2 | HEART RATE: 98 BPM | DIASTOLIC BLOOD PRESSURE: 71 MMHG | SYSTOLIC BLOOD PRESSURE: 113 MMHG | TEMPERATURE: 98 F | HEIGHT: 62 IN | OXYGEN SATURATION: 100 % | RESPIRATION RATE: 17 BRPM

## 2024-10-29 DIAGNOSIS — Z12.4 ENCOUNTER FOR PAPANICOLAOU SMEAR FOR CERVICAL CANCER SCREENING: ICD-10-CM

## 2024-10-29 DIAGNOSIS — E66.811 OBESITY (BMI 30.0-34.9): ICD-10-CM

## 2024-10-29 DIAGNOSIS — Z11.3 ENCOUNTER FOR SPECIAL SCREENING EXAMINATION FOR INFECTION WITH PREDOMINANTLY SEXUAL MODE OF TRANSMISSION: ICD-10-CM

## 2024-10-29 DIAGNOSIS — Z11.51 SCREENING FOR HPV (HUMAN PAPILLOMAVIRUS): ICD-10-CM

## 2024-10-29 DIAGNOSIS — Z72.51 HIGH RISK HETEROSEXUAL BEHAVIOR: ICD-10-CM

## 2024-10-29 DIAGNOSIS — Z01.419 WOMEN'S ANNUAL ROUTINE GYNECOLOGICAL EXAMINATION: Primary | ICD-10-CM

## 2024-10-29 LAB
CANDIDA SPECIES: NEGATIVE
GARDNERELLA: POSITIVE
TRICHOMONAS: NEGATIVE

## 2024-10-29 PROCEDURE — 88141 CYTOPATH C/V INTERPRET: CPT | Mod: ,,, | Performed by: PATHOLOGY

## 2024-10-29 PROCEDURE — 87510 GARDNER VAG DNA DIR PROBE: CPT | Mod: ,,, | Performed by: CLINICAL MEDICAL LABORATORY

## 2024-10-29 PROCEDURE — 3008F BODY MASS INDEX DOCD: CPT | Mod: CPTII,,, | Performed by: ADVANCED PRACTICE MIDWIFE

## 2024-10-29 PROCEDURE — 88142 CYTOPATH C/V THIN LAYER: CPT | Mod: TC,GCY | Performed by: ADVANCED PRACTICE MIDWIFE

## 2024-10-29 PROCEDURE — 87591 N.GONORRHOEAE DNA AMP PROB: CPT | Mod: ,,, | Performed by: CLINICAL MEDICAL LABORATORY

## 2024-10-29 PROCEDURE — 87491 CHLMYD TRACH DNA AMP PROBE: CPT | Mod: ,,, | Performed by: CLINICAL MEDICAL LABORATORY

## 2024-10-29 PROCEDURE — 87660 TRICHOMONAS VAGIN DIR PROBE: CPT | Mod: ,,, | Performed by: CLINICAL MEDICAL LABORATORY

## 2024-10-29 PROCEDURE — 3074F SYST BP LT 130 MM HG: CPT | Mod: CPTII,,, | Performed by: ADVANCED PRACTICE MIDWIFE

## 2024-10-29 PROCEDURE — 1159F MED LIST DOCD IN RCRD: CPT | Mod: CPTII,,, | Performed by: ADVANCED PRACTICE MIDWIFE

## 2024-10-29 PROCEDURE — 3078F DIAST BP <80 MM HG: CPT | Mod: CPTII,,, | Performed by: ADVANCED PRACTICE MIDWIFE

## 2024-10-29 PROCEDURE — 99395 PREV VISIT EST AGE 18-39: CPT | Mod: ,,, | Performed by: ADVANCED PRACTICE MIDWIFE

## 2024-10-29 PROCEDURE — 87480 CANDIDA DNA DIR PROBE: CPT | Mod: ,,, | Performed by: CLINICAL MEDICAL LABORATORY

## 2024-10-30 LAB
CHLAMYDIA BY PCR: NEGATIVE
N. GONORRHOEAE (GC) BY PCR: NEGATIVE

## 2024-10-31 ENCOUNTER — TELEPHONE (OUTPATIENT)
Dept: OBSTETRICS AND GYNECOLOGY | Facility: CLINIC | Age: 34
End: 2024-10-31
Payer: MEDICAID

## 2024-10-31 LAB
GH SERPL-MCNC: ABNORMAL NG/ML
INSULIN SERPL-ACNC: ABNORMAL U[IU]/ML
LAB AP CLINICAL INFORMATION: ABNORMAL
LAB AP GYN INTERPRETATION: ABNORMAL
LAB AP PAP DISCLAIMER COMMENTS: ABNORMAL
RENIN PLAS-CCNC: ABNORMAL NG/ML/H

## 2024-11-01 DIAGNOSIS — B96.89 BACTERIAL VAGINAL INFECTION: Primary | ICD-10-CM

## 2024-11-01 DIAGNOSIS — N76.0 BACTERIAL VAGINAL INFECTION: Primary | ICD-10-CM

## 2024-11-01 RX ORDER — METRONIDAZOLE 500 MG/1
500 TABLET ORAL 2 TIMES DAILY
Qty: 14 TABLET | Refills: 0 | Status: SHIPPED | OUTPATIENT
Start: 2024-11-01 | End: 2024-11-08

## 2024-11-01 RX ORDER — FLUCONAZOLE 150 MG/1
150 TABLET ORAL
Qty: 2 TABLET | Refills: 0 | Status: SHIPPED | OUTPATIENT
Start: 2024-11-01 | End: 2024-11-09

## 2024-11-12 ENCOUNTER — TELEPHONE (OUTPATIENT)
Dept: OBSTETRICS AND GYNECOLOGY | Facility: CLINIC | Age: 34
End: 2024-11-12
Payer: MEDICAID

## 2024-11-12 DIAGNOSIS — R87.810 HUMAN PAPILLOMAVIRUS (HPV) TYPE 16 DNA DETECTED IN CERVICAL SPECIMEN: Primary | ICD-10-CM

## 2024-11-12 DIAGNOSIS — R87.612 LGSIL ON PAP SMEAR OF CERVIX: ICD-10-CM

## 2024-11-12 NOTE — TELEPHONE ENCOUNTER
Talked with Marita. Explained Pap and HPV results which does not mean cancer. Discussed need for additional testing called colposcopy and colpo procedure. Agrees to referral for consult with Dr. Jimenes. Encouraged to call if any questions before appointment.

## 2024-11-14 ENCOUNTER — TELEPHONE (OUTPATIENT)
Dept: OBSTETRICS AND GYNECOLOGY | Facility: CLINIC | Age: 34
End: 2024-11-14
Payer: MEDICAID

## 2024-11-14 NOTE — TELEPHONE ENCOUNTER
Spoke with pt and went over details of procedure; pt voiced understanding and may show up a tad early.       ----- Message from Rosaura sent at 11/14/2024 11:53 AM CST -----  Regarding: about procedure  Who Called: Marita Hernandez    Caller is requesting assistance/information from provider's office.    Symptoms (please be specific): Patient having a procedure on 11/26 she want to know will she need a .   How long has patient had these symptoms:    List of preferred pharmacies on file (remove unneeded): [unfilled]  If different, enter pharmacy into here including location and phone number:       Preferred Method of Contact: Phone Call  Patient's Preferred Phone Number on File: [phone]   Best Call Back Number, if different:  Additional Information:

## 2024-11-26 ENCOUNTER — PROCEDURE VISIT (OUTPATIENT)
Dept: OBSTETRICS AND GYNECOLOGY | Facility: CLINIC | Age: 34
End: 2024-11-26
Payer: MEDICAID

## 2024-11-26 VITALS
HEIGHT: 62 IN | SYSTOLIC BLOOD PRESSURE: 149 MMHG | HEART RATE: 82 BPM | WEIGHT: 181 LBS | BODY MASS INDEX: 33.31 KG/M2 | DIASTOLIC BLOOD PRESSURE: 88 MMHG

## 2024-11-26 DIAGNOSIS — R87.810 HUMAN PAPILLOMAVIRUS (HPV) TYPE 16 DNA DETECTED IN CERVICAL SPECIMEN: ICD-10-CM

## 2024-11-26 DIAGNOSIS — R87.612 LGSIL ON PAP SMEAR OF CERVIX: ICD-10-CM

## 2024-11-26 DIAGNOSIS — Z01.818 PRE-OP TESTING: Primary | ICD-10-CM

## 2024-11-26 DIAGNOSIS — G89.18 POST-OP PAIN: ICD-10-CM

## 2024-11-26 LAB
B-HCG UR QL: NEGATIVE
CTP QC/QA: YES

## 2024-11-26 PROCEDURE — 57454 BX/CURETT OF CERVIX W/SCOPE: CPT | Mod: PBBFAC | Performed by: OBSTETRICS & GYNECOLOGY

## 2024-11-26 PROCEDURE — 96372 THER/PROPH/DIAG INJ SC/IM: CPT | Mod: PBBFAC | Performed by: OBSTETRICS & GYNECOLOGY

## 2024-11-26 PROCEDURE — 81025 URINE PREGNANCY TEST: CPT | Mod: PBBFAC | Performed by: OBSTETRICS & GYNECOLOGY

## 2024-11-26 PROCEDURE — 88305 TISSUE EXAM BY PATHOLOGIST: CPT | Mod: 26,,, | Performed by: PATHOLOGY

## 2024-11-26 PROCEDURE — 99999PBSHW POCT URINE PREGNANCY: Mod: PBBFAC,,,

## 2024-11-26 PROCEDURE — 88305 TISSUE EXAM BY PATHOLOGIST: CPT | Mod: TC,91,SUR | Performed by: OBSTETRICS & GYNECOLOGY

## 2024-11-26 PROCEDURE — 99999PBSHW PR PBB SHADOW TECHNICAL ONLY FILED TO HB: Mod: PBBFAC,JZ,,

## 2024-11-26 RX ORDER — KETOROLAC TROMETHAMINE 30 MG/ML
60 INJECTION, SOLUTION INTRAMUSCULAR; INTRAVENOUS
Status: COMPLETED | OUTPATIENT
Start: 2024-11-26 | End: 2024-11-26

## 2024-11-26 RX ADMIN — KETOROLAC TROMETHAMINE 60 MG: 30 INJECTION, SOLUTION INTRAMUSCULAR at 02:11

## 2024-11-26 NOTE — LETTER
November 26, 2024      [facility]  [address] [place] [address] [place] [address]  Phone: [phone]  Fax: [phone]       Patient: Marita Hernandez   YOB: 1990  Date of Visit: 11/26/2024    To Whom It May Concern:    Allen Hernandez  was at [facility] on 11/26/2024. She may return to work/school on 11/27/2024 with no restrictions. If you have any questions or concerns, or if I can be of further assistance, please do not hesitate to contact me.    Sincerely,    Amee Bowers CMA

## 2024-11-29 LAB
ESTROGEN SERPL-MCNC: NORMAL PG/ML
INSULIN SERPL-ACNC: NORMAL U[IU]/ML
LAB AP CLINICAL INFORMATION: NORMAL
LAB AP GROSS DESCRIPTION: NORMAL
LAB AP LABORATORY NOTES: NORMAL
T3RU NFR SERPL: NORMAL %

## 2024-12-03 ENCOUNTER — TELEPHONE (OUTPATIENT)
Dept: OBSTETRICS AND GYNECOLOGY | Facility: CLINIC | Age: 34
End: 2024-12-03
Payer: MEDICAID

## 2024-12-03 NOTE — TELEPHONE ENCOUNTER
Spoke with pt about results and recommendations; pt voiced understanding.       ----- Message from Rosa sent at 12/2/2024 11:21 AM CST -----  Who Called: Marita Hernandez    Caller is requesting information on test results.    Name of Test (Lab/Mammo/Etc): biopsy   Date of Test: 11/26     Preferred Method of Contact: Phone Call  Patient's Preferred Phone Number on File: [phone]   Best Call Back Number, if different:  Additional Information:

## 2025-02-17 ENCOUNTER — PATIENT MESSAGE (OUTPATIENT)
Dept: OBSTETRICS AND GYNECOLOGY | Facility: CLINIC | Age: 35
End: 2025-02-17
Payer: MEDICAID

## 2025-04-05 NOTE — PROCEDURES
Colposcopy W/BIOPSY AND ECC- Future    Date/Time: 11/26/2024 2:00 PM    Performed by: Be Jimenes MD  Authorized by: Lili Martinez CNM    Consent obatined:  Prior to procedure the appropriate consent was completed and verified  Timeout:Immediately prior to procedure a time out was called to verify the correct patient, procedure, equipment, support staff and site/side marked as required    Colposcopy Site:  Cervix  Position:  Supine  Acrowhite Lesion? Yes    Atypical Vessels? Yes    Transformation Zone Adequate?: Yes    Biopsy?: Yes         Location:  Cervix ((12 00 and 5 00))  ECC Performed?: Yes    LEEP Performed?: No    Estimated blood loss (cc):  3   Patient tolerated the procedure well with no immediate complications.   Post-operative instructions were provided for the patient.   Patient was discharged and will follow up if any complications occur     Colposcopy Procedure Note    Colposcopy Procedure Note    Indications: Pt presents for colposcopy secondary to ASCUS cannot r/o high grade HPV16+.    Procedure Details   The risks and benefits of the procedure were discussed in detail with the patient.  She was aware the risks may include, but are not limited to bleeding, infection and damage to surround structures.  She acknowledged these risks and elected to proceed.  Written consent was obtained.    Urine HCG testing (today): negative    A speculum was placed in vagina and excellent visualization of cervix was achieved, the cervix was swabbed x3 with acetic acid solution. 2 lesions were visualized. Lugol's solution was then placed over the cervix and proximal vagina. 2 lesions noted. Biopsies were obtained at 12 and 5 o'clock.  ECC was performed.  Colposcopy was adequate.    Findings:  Cervix: acetowhite lesions with atypical vessels at 12 and 5 o'clock  Vaginal inspection: Normal without gross visible lesions  Vulvar inspection: Normal without gross visible lesions      Specimens: cervical biopsies at 12  and 5 o'clock and ECC.    Complications: none.    Plan:  Specimens labeled and sent to Pathology.  Treatment plan will be discussed with results in 2 weeks.

## 2025-05-27 ENCOUNTER — OFFICE VISIT (OUTPATIENT)
Dept: OBSTETRICS AND GYNECOLOGY | Facility: CLINIC | Age: 35
End: 2025-05-27
Payer: MEDICAID

## 2025-05-27 VITALS
HEART RATE: 82 BPM | HEIGHT: 62 IN | TEMPERATURE: 98 F | DIASTOLIC BLOOD PRESSURE: 86 MMHG | BODY MASS INDEX: 34.78 KG/M2 | OXYGEN SATURATION: 98 % | SYSTOLIC BLOOD PRESSURE: 130 MMHG | WEIGHT: 189 LBS

## 2025-05-27 DIAGNOSIS — E66.811 OBESITY (BMI 30.0-34.9): ICD-10-CM

## 2025-05-27 DIAGNOSIS — N91.2 AMENORRHEA DUE TO DEPO PROVERA: ICD-10-CM

## 2025-05-27 DIAGNOSIS — R10.2 PELVIC PAIN IN FEMALE: Primary | ICD-10-CM

## 2025-05-27 DIAGNOSIS — Z32.02 URINE PREGNANCY TEST NEGATIVE: ICD-10-CM

## 2025-05-27 LAB
B-HCG UR QL: NEGATIVE
CTP QC/QA: YES

## 2025-05-27 PROCEDURE — 81025 URINE PREGNANCY TEST: CPT | Mod: RHCUB | Performed by: ADVANCED PRACTICE MIDWIFE

## 2025-05-27 PROCEDURE — 3079F DIAST BP 80-89 MM HG: CPT | Mod: CPTII,,, | Performed by: ADVANCED PRACTICE MIDWIFE

## 2025-05-27 PROCEDURE — 99213 OFFICE O/P EST LOW 20 MIN: CPT | Mod: ,,, | Performed by: ADVANCED PRACTICE MIDWIFE

## 2025-05-27 PROCEDURE — 1159F MED LIST DOCD IN RCRD: CPT | Mod: CPTII,,, | Performed by: ADVANCED PRACTICE MIDWIFE

## 2025-05-27 PROCEDURE — 3008F BODY MASS INDEX DOCD: CPT | Mod: CPTII,,, | Performed by: ADVANCED PRACTICE MIDWIFE

## 2025-05-27 PROCEDURE — 3075F SYST BP GE 130 - 139MM HG: CPT | Mod: CPTII,,, | Performed by: ADVANCED PRACTICE MIDWIFE

## 2025-05-27 RX ORDER — IBUPROFEN 800 MG/1
800 TABLET, FILM COATED ORAL EVERY 8 HOURS PRN
Qty: 60 TABLET | Refills: 1 | Status: SHIPPED | OUTPATIENT
Start: 2025-05-27 | End: 2026-05-27

## 2025-05-27 RX ORDER — MEDROXYPROGESTERONE ACETATE 10 MG/1
10 TABLET ORAL DAILY
Qty: 10 TABLET | Refills: 0 | Status: SHIPPED | OUTPATIENT
Start: 2025-05-27 | End: 2025-06-06

## 2025-06-17 ENCOUNTER — PATIENT MESSAGE (OUTPATIENT)
Dept: OBSTETRICS AND GYNECOLOGY | Facility: CLINIC | Age: 35
End: 2025-06-17
Payer: MEDICAID

## 2025-06-20 ENCOUNTER — PATIENT MESSAGE (OUTPATIENT)
Dept: OBSTETRICS AND GYNECOLOGY | Facility: CLINIC | Age: 35
End: 2025-06-20
Payer: MEDICAID